# Patient Record
Sex: FEMALE | Race: WHITE | NOT HISPANIC OR LATINO | URBAN - METROPOLITAN AREA
[De-identification: names, ages, dates, MRNs, and addresses within clinical notes are randomized per-mention and may not be internally consistent; named-entity substitution may affect disease eponyms.]

---

## 2021-04-06 DIAGNOSIS — Z23 ENCOUNTER FOR IMMUNIZATION: ICD-10-CM

## 2024-06-19 ENCOUNTER — OFFICE VISIT (OUTPATIENT)
Dept: FAMILY MEDICINE CLINIC | Facility: CLINIC | Age: 49
End: 2024-06-19

## 2024-06-19 VITALS
DIASTOLIC BLOOD PRESSURE: 70 MMHG | HEART RATE: 82 BPM | WEIGHT: 182.6 LBS | SYSTOLIC BLOOD PRESSURE: 110 MMHG | OXYGEN SATURATION: 98 % | HEIGHT: 62 IN | BODY MASS INDEX: 33.6 KG/M2 | TEMPERATURE: 97.7 F

## 2024-06-19 DIAGNOSIS — Z13.220 ENCOUNTER FOR LIPID SCREENING FOR CARDIOVASCULAR DISEASE: ICD-10-CM

## 2024-06-19 DIAGNOSIS — E55.9 VITAMIN D DEFICIENCY: ICD-10-CM

## 2024-06-19 DIAGNOSIS — Z12.11 COLON CANCER SCREENING: ICD-10-CM

## 2024-06-19 DIAGNOSIS — Z13.6 ENCOUNTER FOR LIPID SCREENING FOR CARDIOVASCULAR DISEASE: ICD-10-CM

## 2024-06-19 DIAGNOSIS — Z00.00 ANNUAL PHYSICAL EXAM: Primary | ICD-10-CM

## 2024-06-19 DIAGNOSIS — Z13.1 DIABETES MELLITUS SCREENING: ICD-10-CM

## 2024-06-19 DIAGNOSIS — R06.81 WITNESSED EPISODE OF APNEA: ICD-10-CM

## 2024-06-19 DIAGNOSIS — F33.42 RECURRENT MAJOR DEPRESSIVE DISORDER, IN FULL REMISSION (HCC): ICD-10-CM

## 2024-06-19 PROBLEM — Z15.89 MTHFR GENE MUTATION: Status: ACTIVE | Noted: 2020-07-22

## 2024-06-19 PROBLEM — G47.9 SLEEP DISTURBANCE: Status: ACTIVE | Noted: 2019-04-19

## 2024-06-19 PROBLEM — M19.90 OSTEOARTHRITIS: Status: ACTIVE | Noted: 2024-06-19

## 2024-06-19 PROBLEM — F90.2 ATTENTION DEFICIT HYPERACTIVITY DISORDER (ADHD), COMBINED TYPE: Status: ACTIVE | Noted: 2020-10-02

## 2024-06-19 PROCEDURE — 99386 PREV VISIT NEW AGE 40-64: CPT | Performed by: FAMILY MEDICINE

## 2024-06-19 RX ORDER — DIPHENOXYLATE HYDROCHLORIDE AND ATROPINE SULFATE 2.5; .025 MG/1; MG/1
1 TABLET ORAL DAILY
COMMUNITY

## 2024-06-19 RX ORDER — DULOXETIN HYDROCHLORIDE 60 MG/1
60 CAPSULE, DELAYED RELEASE ORAL DAILY
Qty: 90 CAPSULE | Refills: 1 | Status: SHIPPED | OUTPATIENT
Start: 2024-06-19

## 2024-06-19 RX ORDER — TRANEXAMIC ACID 650 MG/1
TABLET ORAL
COMMUNITY
Start: 2024-06-07

## 2024-06-19 RX ORDER — LORAZEPAM 0.5 MG/1
0.5 TABLET ORAL 3 TIMES DAILY PRN
COMMUNITY
Start: 2024-02-08

## 2024-06-19 RX ORDER — DULOXETIN HYDROCHLORIDE 60 MG/1
CAPSULE, DELAYED RELEASE ORAL
COMMUNITY
Start: 2024-02-08 | End: 2024-06-19 | Stop reason: SDUPTHER

## 2024-06-19 NOTE — PROGRESS NOTES
Adult Annual Physical  Name: Yu Montes      : 1975      MRN: [id number]  Encounter Provider: Mirella Pendleton DO  Encounter Date: 2024   Encounter department: [facility]    Assessment & Plan   1. Annual physical exam  2. Recurrent major depressive disorder, in full remission (HCC)  -     CBC and differential; Future; Expected date: 2024  -     TSH, 3rd generation with Free T4 reflex; Future; Expected date: 2024  -     CBC and differential  -     TSH, 3rd generation with Free T4 reflex  -     DULoxetine (CYMBALTA) 60 mg delayed release capsule; Take 1 capsule (60 mg total) by mouth daily  3. Diabetes mellitus screening  -     Comprehensive metabolic panel; Future; Expected date: 2024  -     Hemoglobin A1C With EAG; Future; Expected date: 2024  -     Comprehensive metabolic panel  -     Hemoglobin A1C With EAG  4. Encounter for lipid screening for cardiovascular disease  -     Lipid Panel with Direct LDL reflex; Future; Expected date: 2024  -     Lipid Panel with Direct LDL reflex  5. Vitamin D deficiency  -     Vitamin D 25 hydroxy; Future; Expected date: 2024  -     Vitamin D 25 hydroxy  6. Colon cancer screening  -     Cologuard  7. Witnessed episode of apnea  -     Ambulatory Referral to Sleep Medicine; Future  8. BMI 33.0-33.9,adult  -     CBC and differential; Future; Expected date: 2024  -     TSH, 3rd generation with Free T4 reflex; Future; Expected date: 2024  -     CBC and differential  -     TSH, 3rd generation with Free T4 reflex  Immunizations and preventive care screenings were discussed with patient today. Appropriate education was printed on patient's after visit summary.    Counseling:  Alcohol/drug use: discussed moderation in alcohol intake, the recommendations for healthy alcohol use, and avoidance of illicit drug use.  Dental Health: discussed importance of regular tooth brushing, flossing, and dental  "visits.  Exercise: the importance of regular exercise/physical activity was discussed. Recommend exercise 3-5 times per week for at least 30 minutes.          History of Present Illness     Adult Annual Physical:  Patient presents for annual physical. Patient is seen to establish care..     Diet and Physical Activity:  - Diet/Nutrition: well balanced diet.  - Exercise: walking, 5-7 times a week on average and 30-60 minutes on average. has a bad knee, lifts weights    Depression Screening:  - PHQ-2 Score: 0    General Health:  - Sleep: sleeps poorly, snores loudly, witnessed apnea, unrefreshing sleep and daytime hypersomnolence.  - Hearing: normal hearing bilateral ears.  - Vision: wears glasses.  - Dental: regular dental visits.    /GYN Health:  - Follows with GYN: yes.   - Last menstrual cycle: 6/9/2024.   - Contraception:. Heavy menses      Review of Systems   Constitutional:  Positive for fatigue.   Psychiatric/Behavioral:  Positive for sleep disturbance.          Objective     /70 (BP Location: Left arm, Patient Position: Sitting, Cuff Size: Large)   Pulse 82   Temp 97.7 °F (36.5 °C) (Temporal)   Ht 5' 2\" (1.575 m)   Wt 82.8 kg (182 lb 9.6 oz)   SpO2 98%   BMI 33.40 kg/m²     Physical Exam  Vitals and nursing note reviewed.   Constitutional:       General: She is not in acute distress.     Appearance: She is well-developed.   HENT:      Head: Normocephalic.      Right Ear: Tympanic membrane normal.      Left Ear: Tympanic membrane normal.      Mouth/Throat:      Pharynx: No posterior oropharyngeal erythema.   Eyes:      General: No scleral icterus.  Neck:      Thyroid: No thyromegaly.      Vascular: No carotid bruit.   Cardiovascular:      Rate and Rhythm: Normal rate and regular rhythm.      Heart sounds: Normal heart sounds. No murmur heard.  Pulmonary:      Effort: Pulmonary effort is normal.      Breath sounds: Normal breath sounds.   Abdominal:      General: Bowel sounds are normal.      " Palpations: Abdomen is soft.   Musculoskeletal:      Right lower leg: No edema.      Left lower leg: No edema.   Lymphadenopathy:      Cervical: No cervical adenopathy.   Skin:     General: Skin is warm and dry.   Neurological:      Mental Status: She is alert and oriented to person, place, and time.   Psychiatric:         Mood and Affect: Mood normal.       Administrative Statements

## 2024-06-22 LAB
25(OH)D3 SERPL-MCNC: 37 NG/ML (ref 30–100)
ALBUMIN SERPL-MCNC: 4.3 G/DL (ref 3.6–5.1)
ALBUMIN/GLOB SERPL: 1.7 (CALC) (ref 1–2.5)
ALP SERPL-CCNC: 62 U/L (ref 31–125)
ALT SERPL-CCNC: 20 U/L (ref 6–29)
AST SERPL-CCNC: 15 U/L (ref 10–35)
BASOPHILS # BLD AUTO: 47 CELLS/UL (ref 0–200)
BASOPHILS NFR BLD AUTO: 0.6 %
BILIRUB SERPL-MCNC: 0.6 MG/DL (ref 0.2–1.2)
BUN SERPL-MCNC: 13 MG/DL (ref 7–25)
BUN/CREAT SERPL: NORMAL (CALC) (ref 6–22)
CALCIUM SERPL-MCNC: 8.9 MG/DL (ref 8.6–10.2)
CHLORIDE SERPL-SCNC: 104 MMOL/L (ref 98–110)
CHOLEST SERPL-MCNC: 185 MG/DL
CHOLEST/HDLC SERPL: 4.2 (CALC)
CO2 SERPL-SCNC: 22 MMOL/L (ref 20–32)
CREAT SERPL-MCNC: 0.75 MG/DL (ref 0.5–0.99)
EOSINOPHIL # BLD AUTO: 203 CELLS/UL (ref 15–500)
EOSINOPHIL NFR BLD AUTO: 2.6 %
ERYTHROCYTE [DISTWIDTH] IN BLOOD BY AUTOMATED COUNT: 12.6 % (ref 11–15)
EST. AVERAGE GLUCOSE BLD GHB EST-MCNC: 111 MG/DL
EST. AVERAGE GLUCOSE BLD GHB EST-SCNC: 6.2 MMOL/L
GFR/BSA.PRED SERPLBLD CYS-BASED-ARV: 98 ML/MIN/1.73M2
GLOBULIN SER CALC-MCNC: 2.6 G/DL (CALC) (ref 1.9–3.7)
GLUCOSE SERPL-MCNC: 88 MG/DL (ref 65–99)
HBA1C MFR BLD: 5.5 % OF TOTAL HGB
HCT VFR BLD AUTO: 41.4 % (ref 35–45)
HDLC SERPL-MCNC: 44 MG/DL
HGB BLD-MCNC: 13.7 G/DL (ref 11.7–15.5)
LDLC SERPL CALC-MCNC: 120 MG/DL (CALC)
LYMPHOCYTES # BLD AUTO: 2122 CELLS/UL (ref 850–3900)
LYMPHOCYTES NFR BLD AUTO: 27.2 %
MCH RBC QN AUTO: 28.9 PG (ref 27–33)
MCHC RBC AUTO-ENTMCNC: 33.1 G/DL (ref 32–36)
MCV RBC AUTO: 87.3 FL (ref 80–100)
MONOCYTES # BLD AUTO: 523 CELLS/UL (ref 200–950)
MONOCYTES NFR BLD AUTO: 6.7 %
NEUTROPHILS # BLD AUTO: 4906 CELLS/UL (ref 1500–7800)
NEUTROPHILS NFR BLD AUTO: 62.9 %
NONHDLC SERPL-MCNC: 141 MG/DL (CALC)
PLATELET # BLD AUTO: 282 THOUSAND/UL (ref 140–400)
PMV BLD REES-ECKER: 11.3 FL (ref 7.5–12.5)
POTASSIUM SERPL-SCNC: 4.7 MMOL/L (ref 3.5–5.3)
PROT SERPL-MCNC: 6.9 G/DL (ref 6.1–8.1)
RBC # BLD AUTO: 4.74 MILLION/UL (ref 3.8–5.1)
SODIUM SERPL-SCNC: 135 MMOL/L (ref 135–146)
TRIGL SERPL-MCNC: 99 MG/DL
TSH SERPL-ACNC: 1.59 MIU/L
WBC # BLD AUTO: 7.8 THOUSAND/UL (ref 3.8–10.8)

## 2024-07-04 LAB — COLOGUARD RESULT REPORTABLE: NEGATIVE

## 2024-08-13 ENCOUNTER — OFFICE VISIT (OUTPATIENT)
Dept: SLEEP CENTER | Facility: CLINIC | Age: 49
End: 2024-08-13
Payer: COMMERCIAL

## 2024-08-13 VITALS
OXYGEN SATURATION: 98 % | HEART RATE: 77 BPM | SYSTOLIC BLOOD PRESSURE: 120 MMHG | WEIGHT: 185.6 LBS | BODY MASS INDEX: 34.16 KG/M2 | DIASTOLIC BLOOD PRESSURE: 70 MMHG | HEIGHT: 62 IN

## 2024-08-13 DIAGNOSIS — G47.39 SLEEP APNEA-LIKE BEHAVIOR: Primary | ICD-10-CM

## 2024-08-13 DIAGNOSIS — G47.00 INSOMNIA, UNSPECIFIED TYPE: ICD-10-CM

## 2024-08-13 DIAGNOSIS — G47.9 SLEEP DISTURBANCE: ICD-10-CM

## 2024-08-13 DIAGNOSIS — R06.81 WITNESSED EPISODE OF APNEA: ICD-10-CM

## 2024-08-13 PROCEDURE — 99245 OFF/OP CONSLTJ NEW/EST HI 55: CPT | Performed by: NURSE PRACTITIONER

## 2024-08-13 NOTE — PROGRESS NOTES
Consultation - [facility]  Sleep Disorders Center  Yu Montes, 1975, MRN: [id number]    8/13/2024        Reason for Consult / Principal Problem:    Evaluation of possible Obstructive Sleep Apnea    CONSULTING PROVIDER:  Mirella Pendleton DO  [address]  [address]  [place] [address]    ASSESSMENT/PLAN:    1. Sleep apnea-like behavior  -     Home Study; Future  2. Witnessed episode of apnea  -     Ambulatory Referral to Sleep Medicine  -     Home Study; Future  3. Sleep disturbance  -     Home Study; Future  4. Insomnia, unspecified type  -     Home Study; Future       Thank you for the opportunity of participating in the evaluation and care of this patient in the Sleep Clinic at [facility].  If there are any questions regarding this evaluation, please feel free to reach out.   ________________________________________________________________________________________________    HPI: Yu Montes is a 49 y.o. female with PMHx of ADHD, depressive disorder, presents for evaluation of possible obstructive sleep apnea.  Her  reports loud snoring and periods of witnessed apnea.  She wakes up frequently throughout the night for unknown reasons.  She does not feel refreshed when waking up and feels tired in the afternoon.  Symptoms have been ongoing for several years.        Prior Sleep Studies:     No prior sleep study      [place] Sleepiness Scale: Total score: 7/24  Greater or equal to 10 is positive for excessive daytime sleepiness    Pertinent Meds: duloxetine 60mg - takes in the evening, lorazepam 0.5mg, takes prn for anxiety, if she takes it at bedtime, she sleeps better      Sleep-Wake Schedule:  She is self-described as having no morning/night preference.  Bedtime: 10:00pm  Wake Time: 5:30am, not feeling refreshed  Difficulty Falling Asleep: No  Avg Number of Awakenings: 3-5 times per night, usually returns to sleep within a few minutes  Cause  of Awakenings: unknown causes  Weekend Sleep Schedule: 11:00pm-6/7:00pm  Naps: in summer, afternoons, one hour    If She does take a nap, naps are refreshing in quality    Avg TST per 24 hours: 7  hours    Sleep-Related Details:  Preferred Sleep Position: side(s)  SDB Symptoms: snoring, witnessed apneas, multiple awakenings, morning headaches, dry mouth/nose, and waking unrefreshed  Bruxism: Yes, has used an OTC oral appliance  Nocturnal GERD: No  Nocturnal Palpitations: No  Nocturnal Anxiety or Rumination: Yes, occasionally uses ativan, which helps  Sleep-Related Hallucinations: No   Sleep Paralysis: No   Cataplexy: No     No symptoms consistent with restless legs syndrome    She denies any parasomnia activity.    Wake-Related Details  Daytime Sleepiness: Yes, she does not feel refreshed when waking up and feels tired in the afternoon    Drowsy Driving: No  Employment:  currently working full time as a teacher  She works [employer] between the hours of 7:30-4:30pm.    CDL license:  No    Caffeine:  16 ounces of coffee in the am    Tobacco:   reports that she has never smoked. She has never been exposed to tobacco smoke. She has never used smokeless tobacco.  E-cig/Vaping:    E-Cigarette/Vaping    E-Cigarette Use Never User       E-Cigarette/Vaping Substances    Nicotine No     THC No     CBD No     Flavoring No     Other No     Unknown No       Alcohol:   reports current alcohol use. Once per week   Drugs:   reports no history of drug use.      Weight Change:   weight has been relatively stable    She does have difficulty with memory or concentration, but has diagnosis of ADHD.      Other Relevant Labs and Studies:  Labs: I have personally reviewed pertinent lab results.  Lab Results   Component Value Date    WBC 7.8 06/21/2024    HGB 13.7 06/21/2024    HCT 41.4 06/21/2024    MCV 87.3 06/21/2024     06/21/2024      Lab Results   Component Value Date    CALCIUM 8.9 06/21/2024    K 4.7 06/21/2024    CO2 22 06/21/2024  "    06/21/2024    BUN 13 06/21/2024    CREATININE 0.75 06/21/2024     No results found for: \"IRON\", \"TIBC\", \"FERRITIN\"  No results found for: \"[id number]\"  No results found for: \"FOLATE\"      No past medical history on file.  Past Surgical History:   Procedure Laterality Date    KNEE SURGERY  1998     Patient Active Problem List   Diagnosis    Allergic rhinitis    Attention deficit hyperactivity disorder (ADHD), combined type    Depressive disorder    MTHFR gene mutation    Osteoarthritis    Sleep disturbance    Vitamin D deficiency    Sleep apnea-like behavior    Witnessed episode of apnea    Insomnia     Allergies as of 08/13/2024    (No Known Allergies)     REVIEW OF SYSTEMS:  Review of Systems  10-point system review completed, all of which are negative except as mentioned above.       CURRENT MEDICATIONS:  Current Outpatient Medications   Medication Instructions    DULoxetine (CYMBALTA) 60 mg, Oral, Daily    LORazepam (ATIVAN) 0.5 mg, Oral, 3 times daily PRN    multivitamin (THERAGRAN) TABS 1 tablet, Oral, Daily    Tranexamic Acid 650 MG TABS Take 2 tabs TID on heaviest days of cycle     SOCIAL HISTORY:  Social History     Tobacco Use    Smoking status: Never     Passive exposure: Never    Smokeless tobacco: Never   Vaping Use    Vaping status: Never Used   Substance Use Topics    Alcohol use: Yes     Comment: social    Drug use: Never     FAMILY HISTORY:  Family History   Problem Relation Age of Onset    Celiac disease Mother     Hypertension Mother     Diabetes type II Brother      Family History of Sleep Disorders: father has symptoms of sleep apnea but has not been tested    Objective:  Vital Signs:  /70 (BP Location: Left arm, Patient Position: Sitting, Cuff Size: Adult)   Pulse 77   Ht 5' 2\" (1.575 m)   Wt 84.2 kg (185 lb 9.6 oz)   LMP 07/30/2024 (Exact Date)   SpO2 98%   BMI 33.95 kg/m²   Body mass index is 33.95 kg/m².  Neck Circumference: Neck Circumference: 15 inches      PHYSICAL " EXAMINATION:    Constitutional: Alert, cooperative, no distress, appears stated age  Skin: Warm, dry, no rashes noted  Eyes: Conjunctiva/corneas clear  ENT: Nasal congestion absent, nares normal, mucosa normal  Posterior Airspace:   Barrios Tongue Position: 3  Retrognathia: absent  Overbite: absent  High Arched Palate: absent  Tongue Scalloping/Ridging: present  Tonsils:  not visualized  Uvula:  barely visualized  Lungs: Clear to auscultation bilaterally, respirations unlabored  Heart: normal rate and regular rhythm, S1/2 normal, no murmur noted, no rub or gallop  Extremities: Normal, no digital clubbing, no pedal edema  Neuro: No focal deficits noted      The review of systems and following portions of the patient's history were reviewed and updated as appropriate: allergies, current medications, past family history, past medical history, past social history, past surgical history and problem list.    ________________________________________________________________________________________________      I have spent a total time of 55 minutes on 08/13/24 in caring for this patient including Risks and benefits of tx options, Instructions for management, Patient and family education, Importance of tx compliance, Risk factor reductions, Impressions, Counseling / Coordination of care, Documenting in the medical record, Reviewing / ordering tests, medicine, procedures  , and Obtaining or reviewing history  .    Today we discussed the anatomy and physiology of the upper airway.  I pointed out how changes in this region can result in both snoring and abnormal breathing events including apneas and hypopneas.  I explained the most common co-morbidities of untreated sleep apnea.  After this we talked about some forms of treatment including application of positive airway pressure, mandibular advancement devices and surgery.  she would consider use of PAP therapy, if ABENA is confirmed.  She would likely do best to start with a  "full face mask.    In order to evaluate the possibility of Obstructive Sleep Apnea as a cause of the patient's symptoms, a home sleep study will be completed to identify the presence or absence of abnormal nocturnal breathing.  If significant abnormal nocturnal breathing is detected, nasal CPAP will be titrated to find the optimum pressure needed to maintain upper airway patency during sleep.  This may be accomplished using APAP or may require an in lab titration study.  Following testing, the patient will return to the Sleep Disorders Center for set up of CPAP equipment with follow up visit to review compliance and effectiveness of treatment 31-91 days after set up.     The following instructions have been given to the patient today:    Patient Instructions    Schedule home sleep study  After testing, the nurse will call with results and recommendations for plan of treatment   Try changing timing of duloxetine to am to see if it is causing insomnia for you.        Nursing Support:  When:  Monday through Friday 7:00am-5:00pm, except holidays  Where:  Direct phone line is [phone], option 3  If you are having a true emergency, please call 911.  In the event that the line is busy or it is after hours, please leave a voice mail message and we will return your call.  Please speak clearly, leaving your full name, birth date, best number to reach you and the reason for your call.  Medication refills:  We will need the name of the medication, the dosage, the ordering provider, whether you get a 30 day or 90 day refill and the pharmacy name and address.  Medications will be ordered by the providers only.  Nurses cannot call in prescriptions.    To reach the Sleep Disorders Center office staff:  Call [phone], option 1, followed by option 3    TAJ Wells  [facility]    Portions of the record may have been created with voice recognition software. Occasional wrong word or \"sound a like\" " substitutions may have occurred due to the inherent limitations of voice recognition software. Please read the chart carefully and recognize, using context, where substitutions have occurred.

## 2024-08-13 NOTE — PATIENT INSTRUCTIONS
Schedule home sleep study  After testing, the nurse will call with results and recommendations for plan of treatment   Try changing timing of duloxetine to am to see if it is causing insomnia for you.        Nursing Support:  When:  Monday through Friday 7:00am-5:00pm, except holidays  Where:  Direct phone line is [phone], option 3  If you are having a true emergency, please call 911.  In the event that the line is busy or it is after hours, please leave a voice mail message and we will return your call.  Please speak clearly, leaving your full name, birth date, best number to reach you and the reason for your call.  Medication refills:  We will need the name of the medication, the dosage, the ordering provider, whether you get a 30 day or 90 day refill and the pharmacy name and address.  Medications will be ordered by the providers only.  Nurses cannot call in prescriptions.    To reach the [facility] Disorders Center office staff:  Call [phone], option 1, followed by option 3

## 2024-10-02 ENCOUNTER — HOSPITAL ENCOUNTER (OUTPATIENT)
Dept: SLEEP CENTER | Facility: CLINIC | Age: 49
Discharge: HOME | End: 2024-10-02
Payer: COMMERCIAL

## 2024-10-02 DIAGNOSIS — G47.39 SLEEP APNEA-LIKE BEHAVIOR: ICD-10-CM

## 2024-10-02 DIAGNOSIS — R06.81 WITNESSED EPISODE OF APNEA: ICD-10-CM

## 2024-10-02 DIAGNOSIS — G47.00 INSOMNIA, UNSPECIFIED TYPE: ICD-10-CM

## 2024-10-02 DIAGNOSIS — G47.9 SLEEP DISTURBANCE: ICD-10-CM

## 2024-10-02 PROCEDURE — G0399 HOME SLEEP TEST/TYPE 3 PORTA: HCPCS

## 2024-10-03 NOTE — PROGRESS NOTES
Home Sleep Study Documentation    HOME STUDY DEVICE: Noxturnal yes                                           Mali G3 no      Pre-Sleep Home Study:    Set-up and instructions performed by:  BECK Mosley    Technician performed demonstration for Patient: yes    Return demonstration performed by Patient: yes    Written instructions provided to Patient: yes    Patient signed consent form: yes        Post-Sleep Home Study:    Additional comments by Patient: None    Home Sleep Study Failed:no:    Failure reason: N/A    Reported or Detected: N/A    Scored by: BECK Lima

## 2024-10-04 PROBLEM — G47.33 OSA (OBSTRUCTIVE SLEEP APNEA): Status: ACTIVE | Noted: 2024-10-04

## 2024-10-06 PROCEDURE — G0399 HOME SLEEP TEST/TYPE 3 PORTA: HCPCS | Performed by: PSYCHIATRY & NEUROLOGY

## 2024-10-07 DIAGNOSIS — G47.01 INSOMNIA DUE TO MEDICAL CONDITION: ICD-10-CM

## 2024-10-07 DIAGNOSIS — F32.A DEPRESSIVE DISORDER: ICD-10-CM

## 2024-10-07 DIAGNOSIS — G47.9 SLEEP DISTURBANCE: ICD-10-CM

## 2024-10-07 DIAGNOSIS — G47.33 OSA (OBSTRUCTIVE SLEEP APNEA): Primary | ICD-10-CM

## 2024-10-14 ENCOUNTER — PATIENT MESSAGE (OUTPATIENT)
Dept: SLEEP CENTER | Facility: CLINIC | Age: 49
End: 2024-10-14

## 2024-10-17 ENCOUNTER — TELEPHONE (OUTPATIENT)
Dept: SLEEP CENTER | Facility: CLINIC | Age: 49
End: 2024-10-17

## 2024-10-22 LAB

## 2024-10-28 LAB
DME PARACHUTE DELIVERY DATE ACTUAL: NORMAL
DME PARACHUTE DELIVERY DATE EXPECTED: NORMAL
DME PARACHUTE DELIVERY DATE REQUESTED: NORMAL
DME PARACHUTE ITEM DESCRIPTION: NORMAL
DME PARACHUTE ORDER STATUS: NORMAL
DME PARACHUTE SUPPLIER NAME: NORMAL
DME PARACHUTE SUPPLIER PHONE: NORMAL

## 2024-11-03 ENCOUNTER — PATIENT MESSAGE (OUTPATIENT)
Dept: SLEEP CENTER | Facility: CLINIC | Age: 49
End: 2024-11-03

## 2024-11-03 DIAGNOSIS — G47.33 OSA (OBSTRUCTIVE SLEEP APNEA): Primary | ICD-10-CM

## 2024-11-04 ENCOUNTER — TELEPHONE (OUTPATIENT)
Dept: SLEEP CENTER | Facility: CLINIC | Age: 49
End: 2024-11-04

## 2024-11-04 DIAGNOSIS — F33.42 RECURRENT MAJOR DEPRESSIVE DISORDER, IN FULL REMISSION (HCC): ICD-10-CM

## 2024-11-04 RX ORDER — DULOXETIN HYDROCHLORIDE 30 MG/1
30 CAPSULE, DELAYED RELEASE ORAL DAILY
Qty: 90 CAPSULE | Refills: 0 | Status: SHIPPED | OUTPATIENT
Start: 2024-11-04

## 2024-11-04 NOTE — TELEPHONE ENCOUNTER
Per [employer] message:  Hello, I have been trying to get used to the cpap machine. When I first put the mask on for about 5-10 minutes I feel extremely claustrophobic and sometimes have to take it off. I’m wondering if there is not enough air when it starts and that’s why I feel like I can’t get air. Also I am interested in trying a pillow nose mask to see if that works better. Who do I contact for thes?? Thanks for your help   -------------------------------------------------    30 day and 7 day detailed compliance reports uploaded to media.    Patient notified via [employer] regarding recommendation to schedule mask fitting appointment.    TAJ Wells notified.

## 2024-11-07 LAB
DME PARACHUTE DELIVERY DATE REQUESTED: NORMAL
DME PARACHUTE ITEM DESCRIPTION: NORMAL
DME PARACHUTE ORDER STATUS: NORMAL
DME PARACHUTE SUPPLIER NAME: NORMAL
DME PARACHUTE SUPPLIER PHONE: NORMAL

## 2024-11-11 ENCOUNTER — TELEPHONE (OUTPATIENT)
Dept: SLEEP CENTER | Facility: CLINIC | Age: 49
End: 2024-11-11

## 2024-11-11 NOTE — TELEPHONE ENCOUNTER
nevaeh Montes   to [facility] (supporting TAJ Wells)   FERCHO      11/8/24  9:15 AM  Hello,      I recently started using a nasal pillow and that seems better. I do not think my pressure ever changed though. It is still at 4 when I start and during the night I have been waking up around 1 or 2 and the pressure is only at 6. It is hard to go back to sleep at that point. Are you able to help with this?  Thank you very much.  _____________________________  Call to patient voicemail message and number left for her to return a call to [phone].  [employer] message sent as well.

## 2024-11-12 LAB
DME PARACHUTE DELIVERY DATE ACTUAL: NORMAL
DME PARACHUTE DELIVERY DATE REQUESTED: NORMAL
DME PARACHUTE ITEM DESCRIPTION: NORMAL
DME PARACHUTE ORDER STATUS: NORMAL
DME PARACHUTE SUPPLIER NAME: NORMAL
DME PARACHUTE SUPPLIER PHONE: NORMAL

## 2024-12-02 ENCOUNTER — PATIENT MESSAGE (OUTPATIENT)
Dept: SLEEP CENTER | Facility: CLINIC | Age: 49
End: 2024-12-02

## 2024-12-02 DIAGNOSIS — G47.01 INSOMNIA DUE TO MEDICAL CONDITION: Primary | ICD-10-CM

## 2024-12-02 RX ORDER — TRAZODONE HYDROCHLORIDE 50 MG/1
50 TABLET, FILM COATED ORAL
Qty: 30 TABLET | Refills: 2 | Status: SHIPPED | OUTPATIENT
Start: 2024-12-02

## 2024-12-04 NOTE — PROGRESS NOTES
Assessment & Plan  Obesity, Class I, BMI 30-34.9  Starting Zepbound 2.5 mg, increase to 5 mg after 4 weeks if tolerating and approved     Current weight: 190  Goal weight: 160    Common Side effects: nausea, vomiting, constipation, alopecia, fatigue and reflux  Precautions: No history of recurrent pancreatitis, no suicidal behavior and ideation, diabetic retinopathy.      Contraindications: No history of Men 2 syndrome and family/personal history of medullary thyroid cancer.      Patient understands the risk and benefits of using this medication as well as the side effects.      Patient has been overweight for many years.  They have tried many different ways to lose weight including various diets and exercise regimens which have been unsuccessful in the recent years. As per FDA guidelines, patient has a BMI greater than 30 associated with comorbidities which include vitamin D deficiency, major depressive disorder, hyperlipidemia, Jony on CPAP    Initial weight loss goal of 5-10% weight loss for improved health as studies have shown this has proven to decrease risk of obesity related conditions and co-morbid conditions and/or prevent weight-related complications. Explained the importance of making lifestyle modifications changes in conjunction with weight loss medications.    Discussed options of HealthyCORE-Intensive Lifestyle Intervention Program, Very Low Calorie Diet-VLCD, and Conservative Program and the role of weight loss medications.Patient is interested in pursuing Conservative Program.     Recommended to not skip meals.  Spoke about practicing mindful eating with frequent meals and portion control.  Recommend to avoid snacking at bedtime especially with a high carbohydrate load and to be consistent with protein intake. Recommend adequate hydration which is at least half your body weight in ounces unless medically contraindicated (ie. Systolic heart failure) with also considering GI losses and medication  induced fluid loss if applicable. Recommend incorporating resistance training and/or strength training to help improve metabolic rate if able to tolerate.       Hyperlipidemia  Diet controlled   Insomnia  Continue with Trazodone   ABENA (obstructive sleep apnea)  Continue with CPAP  Depressive disorder  Continue Cymbalta 30       Return in about 5 weeks (around 2025) for followup .      Total time spent reviewing chart, reviewing labs,interviewing patient, examining patient, discussing plan, answering all questions, and documentin min. Most recent notes and records were reviewed.      ______________________________________________________________________        Subjective:     Chief Complaint   Patient presents with    Consult     MWM-Consult; Gw-160lb ; Waist-40in ;Pt has sleep apnea       HPI: Yu Montes  is a 49 y.o. female with past medical history of vitamin D deficiency, class I obesity, major depressive disorder, hyperlipidemia, ABENA on CPAP presents to the clinic to pursue weight loss management.     Prior anti-obesity medication: none   Prior weight loss attempts and onset of weight gain: Tired for 15 years, self created diets, exercise.     Current weight: 190  Goal weight: 160    Daily Diet: Yougurt, granola, prestzels and sandwhich ham cheese, Dinner- hamburgers, spagetti, tacos. Snacks: chocolates (sweets)   Skip Meals: do not   Dining out/takeout: twice a month  Hydration: 1 cup coffee, 4-5 bottles.     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 Denies any hx of glaucoma, seizures, kidney stones, gallstones, CAD, PAD, palpitations, arrhythmia. Denies uncontrolled anxiety or depression, suicidal ideation or behavior, insomnia or sleep disturbance.     Review Of Systems:  Constitutional:  Negative for diaphoresis.  "  Gastrointestinal:  Negative for abdominal pain, constipation, nausea and vomiting.   Skin:  Negative for pallor.   Psychiatric/Behavioral:  Negative for agitation, behavioral problems, confusion, dysphoric mood and hallucinations.    All other systems reviewed and are negative.     Objective:  /80   Pulse 84   Temp 98.5 °F (36.9 °C)   Resp 16   Ht 5' 2\" (1.575 m)   Wt 86.2 kg (190 lb)   BMI 34.75 kg/m²     Wt Readings from Last 10 Encounters:   12/05/24 86.2 kg (190 lb)   08/13/24 84.2 kg (185 lb 9.6 oz)   06/19/24 82.8 kg (182 lb 9.6 oz)       Physical Exam  Constitutional:       General: No acute distress.   HENT:      Head: Normocephalic and atraumatic.   Eyes:      Extraocular Movements: Extraocular movements intact.      Conjunctiva/sclera: Conjunctivae normal.      Pupils: Pupils are equal, round, and reactive to light.   Cardiovascular:      Rate and Rhythm: Normal rate.   Pulmonary:      Effort: Pulmonary effort is normal.   Neurological:      General: No focal deficit present.      Mental Status: Alert and oriented to person, place, and time. Mental status is at baseline.   Psychiatric:         Mood and Affect: Mood normal.         Behavior: Behavior normal.     Labs and Imaging  Recent labs and imaging have been personally reviewed.    Lab Results   Component Value Date    WBC 7.8 06/21/2024    HGB 13.7 06/21/2024    HCT 41.4 06/21/2024    MCV 87.3 06/21/2024     06/21/2024       Lab Results   Component Value Date    SODIUM 135 06/21/2024    K 4.7 06/21/2024     06/21/2024    CO2 22 06/21/2024    AGAP 7 07/28/2022    BUN 13 06/21/2024    CREATININE 0.75 06/21/2024    GLUC 88 06/21/2024    CALCIUM 8.9 06/21/2024    AST 15 06/21/2024    ALT 20 06/21/2024    ALKPHOS 62 06/21/2024    TP 6.9 06/21/2024    TBILI 0.6 06/21/2024    EGFR 98 06/21/2024       Lab Results   Component Value Date    HGBA1C 5.5 06/21/2024       Lab Results   Component Value Date    TSH 1.33 04/25/2019       Lab " Results   Component Value Date    CHOLESTEROL 185 06/21/2024       Lab Results   Component Value Date    HDL 44 (L) 06/21/2024       Lab Results   Component Value Date    TRIG 99 06/21/2024       Lab Results   Component Value Date    LDLCALC 120 (H) 06/21/2024

## 2024-12-05 ENCOUNTER — OFFICE VISIT (OUTPATIENT)
Dept: BARIATRICS | Facility: CLINIC | Age: 49
End: 2024-12-05
Payer: COMMERCIAL

## 2024-12-05 VITALS
WEIGHT: 190 LBS | BODY MASS INDEX: 34.96 KG/M2 | HEIGHT: 62 IN | HEART RATE: 84 BPM | DIASTOLIC BLOOD PRESSURE: 80 MMHG | TEMPERATURE: 98.5 F | RESPIRATION RATE: 16 BRPM | SYSTOLIC BLOOD PRESSURE: 128 MMHG

## 2024-12-05 DIAGNOSIS — E78.5 HYPERLIPIDEMIA: ICD-10-CM

## 2024-12-05 DIAGNOSIS — E66.811 OBESITY, CLASS I, BMI 30-34.9: ICD-10-CM

## 2024-12-05 DIAGNOSIS — G47.33 OSA (OBSTRUCTIVE SLEEP APNEA): ICD-10-CM

## 2024-12-05 DIAGNOSIS — E66.811 OBESITY, CLASS I, BMI 30-34.9: Primary | ICD-10-CM

## 2024-12-05 DIAGNOSIS — G47.00 INSOMNIA: ICD-10-CM

## 2024-12-05 DIAGNOSIS — F32.A DEPRESSIVE DISORDER: ICD-10-CM

## 2024-12-05 PROCEDURE — 99204 OFFICE O/P NEW MOD 45 MIN: CPT | Performed by: STUDENT IN AN ORGANIZED HEALTH CARE EDUCATION/TRAINING PROGRAM

## 2024-12-05 RX ORDER — TIRZEPATIDE 2.5 MG/.5ML
2.5 INJECTION, SOLUTION SUBCUTANEOUS WEEKLY
Qty: 2 ML | Refills: 0 | Status: SHIPPED | OUTPATIENT
Start: 2024-12-05 | End: 2024-12-05 | Stop reason: SDUPTHER

## 2024-12-05 RX ORDER — TIRZEPATIDE 2.5 MG/.5ML
2.5 INJECTION, SOLUTION SUBCUTANEOUS WEEKLY
Qty: 2 ML | Refills: 0 | Status: SHIPPED | OUTPATIENT
Start: 2024-12-05 | End: 2025-01-02

## 2024-12-05 NOTE — TELEPHONE ENCOUNTER
*PHARMACY CHANGE*    Reason for call:   [x] Refill   [] Prior Auth  [] Other:     Office:   [] PCP/Provider -   [x] Specialty/Provider - [facility] / Harvey Gan MD    Medication:   ~ tirzepatide (Zepbound) 2.5 mg/0.5 mL auto-injector - Inject 0.5 mL (2.5 mg total) under the skin once a week for 28 days     Pharmacy:   [id number] - [place] [address]     Does the patient have enough for 3 days?   [] Yes   [x] No - Send as HP to POD

## 2024-12-10 ENCOUNTER — TELEPHONE (OUTPATIENT)
Dept: BARIATRICS | Facility: CLINIC | Age: 49
End: 2024-12-10

## 2024-12-10 NOTE — TELEPHONE ENCOUNTER
PA for Zepbound 2.5mg SUBMITTED to [employer]     via    [x]CMM-KEY: [id number]  []Surescripts-Case ID #   []Availity-Auth ID # NDC #   []Faxed to plan   []Other website   []Phone call Case ID #     []PA sent as URGENT    All office notes, labs and other pertaining documents and studies sent. Clinical questions answered. Awaiting determination from insurance company.     Turnaround time for your insurance to make a decision on your Prior Authorization can take 7-21 business days.

## 2024-12-26 DIAGNOSIS — E78.5 HYPERLIPIDEMIA: ICD-10-CM

## 2024-12-26 DIAGNOSIS — E66.811 OBESITY, CLASS I, BMI 30-34.9: ICD-10-CM

## 2024-12-26 RX ORDER — TIRZEPATIDE 2.5 MG/.5ML
2.5 INJECTION, SOLUTION SUBCUTANEOUS WEEKLY
Qty: 2 ML | Refills: 0 | Status: SHIPPED | OUTPATIENT
Start: 2024-12-26 | End: 2025-01-23

## 2024-12-31 DIAGNOSIS — E78.5 HYPERLIPIDEMIA, UNSPECIFIED HYPERLIPIDEMIA TYPE: ICD-10-CM

## 2024-12-31 DIAGNOSIS — G47.00 INSOMNIA, UNSPECIFIED TYPE: ICD-10-CM

## 2024-12-31 DIAGNOSIS — E66.811 OBESITY, CLASS I, BMI 30-34.9: Primary | ICD-10-CM

## 2024-12-31 DIAGNOSIS — G47.33 OSA (OBSTRUCTIVE SLEEP APNEA): ICD-10-CM

## 2024-12-31 DIAGNOSIS — F32.A DEPRESSIVE DISORDER: ICD-10-CM

## 2024-12-31 RX ORDER — TIRZEPATIDE 5 MG/.5ML
5 INJECTION, SOLUTION SUBCUTANEOUS WEEKLY
Qty: 2 ML | Refills: 3 | Status: SHIPPED | OUTPATIENT
Start: 2024-12-31 | End: 2024-12-31

## 2024-12-31 RX ORDER — TIRZEPATIDE 5 MG/.5ML
5 INJECTION, SOLUTION SUBCUTANEOUS WEEKLY
Qty: 2 ML | Refills: 3 | Status: SHIPPED | OUTPATIENT
Start: 2024-12-31

## 2025-01-06 ENCOUNTER — TELEPHONE (OUTPATIENT)
Age: 50
End: 2025-01-06

## 2025-01-06 NOTE — TELEPHONE ENCOUNTER
Patient called need to reschedule appointment. Warm transferred to [facility] for further assistance.

## 2025-01-08 ENCOUNTER — OFFICE VISIT (OUTPATIENT)
Dept: SLEEP CENTER | Facility: CLINIC | Age: 50
End: 2025-01-08
Payer: COMMERCIAL

## 2025-01-08 VITALS
BODY MASS INDEX: 32.76 KG/M2 | SYSTOLIC BLOOD PRESSURE: 122 MMHG | DIASTOLIC BLOOD PRESSURE: 82 MMHG | WEIGHT: 178 LBS | HEIGHT: 62 IN

## 2025-01-08 DIAGNOSIS — F51.04 PSYCHOPHYSIOLOGICAL INSOMNIA: ICD-10-CM

## 2025-01-08 DIAGNOSIS — G47.9 SLEEP DISTURBANCE: ICD-10-CM

## 2025-01-08 DIAGNOSIS — E66.09 CLASS 1 OBESITY DUE TO EXCESS CALORIES WITHOUT SERIOUS COMORBIDITY WITH BODY MASS INDEX (BMI) OF 32.0 TO 32.9 IN ADULT: ICD-10-CM

## 2025-01-08 DIAGNOSIS — E66.811 CLASS 1 OBESITY DUE TO EXCESS CALORIES WITHOUT SERIOUS COMORBIDITY WITH BODY MASS INDEX (BMI) OF 32.0 TO 32.9 IN ADULT: ICD-10-CM

## 2025-01-08 DIAGNOSIS — G47.33 OBSTRUCTIVE SLEEP APNEA TREATED WITH CONTINUOUS POSITIVE AIRWAY PRESSURE (CPAP): Primary | ICD-10-CM

## 2025-01-08 PROBLEM — G47.39 SLEEP APNEA-LIKE BEHAVIOR: Status: RESOLVED | Noted: 2024-08-13 | Resolved: 2025-01-08

## 2025-01-08 PROCEDURE — 99214 OFFICE O/P EST MOD 30 MIN: CPT | Performed by: NURSE PRACTITIONER

## 2025-01-08 RX ORDER — TRAZODONE HYDROCHLORIDE 50 MG/1
75 TABLET, FILM COATED ORAL
Qty: 135 TABLET | Refills: 2 | Status: SHIPPED | OUTPATIENT
Start: 2025-01-08

## 2025-01-08 NOTE — ASSESSMENT & PLAN NOTE
She noted increase in sleep initiation and sleep maintenance insomnia when she began use of CPAP equipment.  She also reported that she was waking up earlier than she would like, sometimes as early as 3:00-4:00am.  We discussed pushing bedtime later, which may allow her to sleep later.    She began use of trazodone at bedtime.  She noticed improvement in sleep initiation, but still wakes up a few times per night.  She reports that she is able to return to sleep more easily with use of trazodone.    She denies any side effects with use of trazodone.  She will increase the dose to 75mg at bedtime.  She was advised to call with an update if not improving.    We discussed changing to doxepin, if symptoms not improving with trazodone.  We also discussed changing cymbalta to am dosing instead of at bedtime.    Orders:    traZODone (DESYREL) 50 mg tablet; Take 1.5 tablets (75 mg total) by mouth daily at bedtime

## 2025-01-08 NOTE — PATIENT INSTRUCTIONS
Patient Instructions:    1.  Continue use of CPAP equipment nightly  2.  Continue to clean your equipment, as discussed  3.  Contact the [facility] with any questions or concerns prior to your next visit, as needed  4.  Schedule visit for follow-up in 6 months  5.  Continue using Zepbound for weight loss through weight management  6.  Continue using the [employer] jad and call if events are consistently higher than 5  7.  Continue trazodone and increase to one and a half tablets at bedtime  8.  Change dosing time of cymbalta to the am        Thank you for trusting me with your care!    I know we often  cover a lot of information at the visit, so if you have follow-up questions, are unclear about the plan, or feel there were important items that we did not discuss or you did not receive clarity on, please don't hesitate to reach out to me.     irisnote messages are preferred for routine matters.  Please make sure to call for urgent matters as there can be a delay in responding to questions over irisnote.    IMPORTANT- Prior to a sleep study (at home or in the sleep lab), I strongly recommend contacting your medical insurance first to understand your benefits (including deductible if applicable), coverage for this test, and out of pocket costs.  Even if the test is approved by medical insurance, the cost to you is determined by your medical benefits.   If you have concerns about your out of pocket costs for sleep testing, please contact me/the office before you complete the test and we can discuss if there are alternate options.     I recommend following this advice in general before any lab test, imaging test, doctor visit, surgery, or ordering CPAP supplies as it is best to understand your coverage to avoid unexpected bills after the fact.       Nursing Support:  When: Monday through Friday 7:30A-4:30PM except holidays  Where: Our direct line is [phone]  *3  *1.      If you are having a true emergency  please call 911.  In the event that the line is busy or it is after hours please leave a voice message and we will return your call.  Please speak clearly, leaving your full name, birth date, best number to reach you and the reason for your call.   Medication refills: We will need the name of the medication, the dosage, the ordering provider, whether you get a 30 or 90 day refill, and the pharmacy name and address.  Medications will be ordered by the provider only.  Nurses cannot call in prescriptions.  Please allow 7 days for medication refills.  Physician requested updates: If your provider requested that you call with an update after starting medication, please be ready to provide us the medication and dosage, what time you take your medication, the time you attempt to fall asleep, time you fall asleep, when you wake up, and what time you get out of bed.  Sleep Study Results: We will contact you with sleep study results and/or next steps after the physician has reviewed your testing.

## 2025-01-08 NOTE — ASSESSMENT & PLAN NOTE
She began use of CPAP equipment.  She notes improved snoring, better sleep with fewer awakenings, feeling less sleepy in the daytime, fewer headaches, improved mood and improved health with use of CPAP.    She states that she was having bilateral carpal tunnel symptoms and was planning to have surgery.  Since using CPAP equipment, she has not had any numbness and tingling in her arm/wrist/hands as she had previously.  She tried several different masks.  She has some irritation along the nasal cradle.  A mask fitting was arranged today.  She feels the starting pressure is comfortable, but notices it feels too strong at times and leaks around the mouth area.  A pressure change was ordered today.      Current PAP Compliance Data:  Average usage:  She has been able to use the equipment 100% of all days recorded.  Average usage was 4 or more hours 100% of all days recorded.  Average hours used:  6 hours and 39 minutes  Average time in an air leak:  minimal   Average pressure:  12 cm of water pressure  Residual AHI:  0.4/hour, including 0.3 OA, 0.0 CA, 0.1 hypopneas    The patient feels they benefit from the use of PAP equipment and would like to continue PAP therapy.    Response to treatment has been very good.    The patient is at goal for hours of PAP use and at goal for effectiveness of treatment.    A prescription for supplies has been provided to last for the next year.  The patient was advised to continue to clean the equipment appropriately, as discussed and to change supplies regularly.    She will continue using this equipment at the settings noted above for the next 6 months.  At that timeshe will then return for a routine follow-up evaluation. I have asked the patient to contact the [facility] if she encounters any difficulties prior to that time.  Orders:    Resmed DME Pressure Change    PAP DME Resupply/Reorder

## 2025-01-08 NOTE — PROGRESS NOTES
Name: Yu Montes      : 1975      MRN: [id number]  Encounter Provider: TAJ Wells  Encounter Date: 2025   Encounter department[facility]  :  Assessment & Plan  Obstructive sleep apnea treated with continuous positive airway pressure (CPAP)  She began use of CPAP equipment.  She notes improved snoring, better sleep with fewer awakenings, feeling less sleepy in the daytime, fewer headaches, improved mood and improved health with use of CPAP.    She states that she was having bilateral carpal tunnel symptoms and was planning to have surgery.  Since using CPAP equipment, she has not had any numbness and tingling in her arm/wrist/hands as she had previously.  She tried several different masks.  She has some irritation along the nasal cradle.  A mask fitting was arranged today.  She feels the starting pressure is comfortable, but notices it feels too strong at times and leaks around the mouth area.  A pressure change was ordered today.      Current PAP Compliance Data:  Average usage:  She has been able to use the equipment 100% of all days recorded.  Average usage was 4 or more hours 100% of all days recorded.  Average hours used:  6 hours and 39 minutes  Average time in an air leak:  minimal   Average pressure:  12 cm of water pressure  Residual AHI:  0.4/hour, including 0.3 OA, 0.0 CA, 0.1 hypopneas    The patient feels they benefit from the use of PAP equipment and would like to continue PAP therapy.    Response to treatment has been very good.    The patient is at goal for hours of PAP use and at goal for effectiveness of treatment.    A prescription for supplies has been provided to last for the next year.  The patient was advised to continue to clean the equipment appropriately, as discussed and to change supplies regularly.    She will continue using this equipment at the settings noted above for the next 6 months.  At that timeshe will then return for a routine  follow-up evaluation. I have asked the patient to contact the [facility] if she encounters any difficulties prior to that time.  Orders:    Resmed DME Pressure Change    PAP DME Resupply/Reorder    Psychophysiological insomnia  She noted increase in sleep initiation and sleep maintenance insomnia when she began use of CPAP equipment.  She also reported that she was waking up earlier than she would like, sometimes as early as 3:00-4:00am.  We discussed pushing bedtime later, which may allow her to sleep later.    She began use of trazodone at bedtime.  She noticed improvement in sleep initiation, but still wakes up a few times per night.  She reports that she is able to return to sleep more easily with use of trazodone.    She denies any side effects with use of trazodone.  She will increase the dose to 75mg at bedtime.  She was advised to call with an update if not improving.    We discussed changing to doxepin, if symptoms not improving with trazodone.  We also discussed changing cymbalta to am dosing instead of at bedtime.    Orders:    traZODone (DESYREL) 50 mg tablet; Take 1.5 tablets (75 mg total) by mouth daily at bedtime    Sleep disturbance  She noted increase in sleep initiation and sleep maintenance insomnia when she began use of CPAP equipment.  She also reported that she was waking up earlier than she would like, sometimes as early as 3:00-4:00am.  We discussed pushing bedtime later, which may allow her to sleep later.    She began use of trazodone at bedtime.  She noticed improvement in sleep initiation, but still wakes up a few times per night.  She reports that she is able to return to sleep more easily with use of trazodone.    She denies any side effects with use of trazodone.  She will increase the dose to 75mg at bedtime.  She was advised to call with an update if not improving.    We discussed changing to doxepin, if symptoms not improving with trazodone.  We also discussed changing  cymbalta to am dosing instead of at bedtime.       Class 1 obesity due to excess calories without serious comorbidity with body mass index (BMI) of 32.0 to 32.9 in adult  Obesity - We reviewed the association of obesity on obstructive sleep apnea and sleep disordered breathing. Encouraged patient to follow healthy diet, regular exercise regimen, and pursue weight loss to manage symptoms.  We reviewed the new FDA indication for use of Zepbound in patients with comorbid obesity and ABENA.  She plans to continue to work with weight management and continue use of Zepbound.             History of Present Illness   Yu Montes is a 49 y.o. female with PMHx of ADHD, depressive disorder, presents for follow up of mild obstructive sleep apnea.  Her  reported loud snoring and periods of witnessed apnea.  She wakes up frequently throughout the night for unknown reasons.  Sleep was non-refreshing with daytime sleepiness.  Symptoms were ongoing for several years.   She completed a home sleep study in October 2024.  During the study, she had a total of 41 respiratory events made up of 13 obstructive apneas, 0 central apneas,  0 mixed apneas and 28 hypopneas resulting in a respiratory event index (MYLES) of 5.2 .  The lowest SPO2 recorded is 86.0%.  Due to symptoms, the mild ABENA was treated with AutoCPAP.        Patient accompanied at this vist by : unaccompanied    Sitting and reading: (Patient-Rptd) (P) Slight chance of dozing  Watching TV: (Patient-Rptd) (P) Slight chance of dozing  Sitting, inactive in a public place (e.g. a theatre or a meeting): (Patient-Rptd) (P) Would never doze  As a passenger in a car for an hour without a break: (Patient-Rptd) (P) Would never doze  Lying down to rest in the afternoon when circumstances permit: (Patient-Rptd) (P) Moderate chance of dozing  Sitting and talking to someone: (Patient-Rptd) (P) Would never doze  Sitting quietly after a lunch without alcohol: (Patient-Rptd) (P) Would never  "doze  In a car, while stopped for a few minutes in traffic: (Patient-Rptd) (P) Would never doze  Total score: (Patient-Rptd) (P) 4     Review of Systems   Psychiatric/Behavioral:  Positive for sleep disturbance.        Current Outpatient Medications on File Prior to Visit   Medication Sig Dispense Refill    DULoxetine (CYMBALTA) 30 mg delayed release capsule Take 1 capsule (30 mg total) by mouth daily 90 capsule 0    LORazepam (ATIVAN) 0.5 mg tablet Take 0.5 mg by mouth Three times daily as needed      multivitamin (THERAGRAN) TABS Take 1 tablet by mouth daily      tirzepatide (Zepbound) 5 mg/0.5 mL auto-injector Inject 0.5 mL (5 mg total) under the skin once a week 2 mL 3    Tranexamic Acid 650 MG TABS Take 2 tabs TID on heaviest days of cycle      [DISCONTINUED] traZODone (DESYREL) 50 mg tablet Take 1 tablet (50 mg total) by mouth daily at bedtime 30 tablet 2     No current facility-administered medications on file prior to visit.      Objective   /82   Ht 5' 2\" (1.575 m)   Wt 80.7 kg (178 lb)   BMI 32.56 kg/m²        Physical Exam    Constitutional: Alert, cooperative, no distress, appears stated age, obese  Skin: Warm, dry, no rashes noted  Lungs: Clear to auscultation bilaterally, respirations unlabored  Heart: normal rate and regular rhythm, S1/2 normal, no murmur noted, no rub or gallop  Extremities: Normal, no digital clubbing, no pedal edema  Neuro: No focal deficits noted    Data  Lab Results   Component Value Date    HGB 13.7 06/21/2024    HCT 41.4 06/21/2024    MCV 87.3 06/21/2024      Lab Results   Component Value Date    CALCIUM 8.9 06/21/2024    K 4.7 06/21/2024    CO2 22 06/21/2024     06/21/2024    BUN 13 06/21/2024    CREATININE 0.75 06/21/2024     No results found for: \"IRON\", \"TIBC\", \"FERRITIN\"  Lab Results   Component Value Date    AST 15 06/21/2024    ALT 20 06/21/2024     Patient Instructions:    1.  Continue use of CPAP equipment nightly  2.  Continue to clean your equipment, " as discussed  3.  Contact the [facility] with any questions or concerns prior to your next visit, as needed  4.  Schedule visit for follow-up in 6 months  5.  Continue using Zepbound for weight loss through weight management  6.  Continue using the [employer] jad and call if events are consistently higher than 5  7.  Continue trazodone and increase to one and a half tablets at bedtime  8.  Change dosing time of cymbalta to the am        Thank you for trusting me with your care!    I know we often  cover a lot of information at the visit, so if you have follow-up questions, are unclear about the plan, or feel there were important items that we did not discuss or you did not receive clarity on, please don't hesitate to reach out to me.     Accion messages are preferred for routine matters.  Please make sure to call for urgent matters as there can be a delay in responding to questions over Accion.    IMPORTANT- Prior to a sleep study (at home or in the sleep lab), I strongly recommend contacting your medical insurance first to understand your benefits (including deductible if applicable), coverage for this test, and out of pocket costs.  Even if the test is approved by medical insurance, the cost to you is determined by your medical benefits.   If you have concerns about your out of pocket costs for sleep testing, please contact me/the office before you complete the test and we can discuss if there are alternate options.     I recommend following this advice in general before any lab test, imaging test, doctor visit, surgery, or ordering CPAP supplies as it is best to understand your coverage to avoid unexpected bills after the fact.       Nursing Support:  When: Monday through Friday 7:30A-4:30PM except holidays  Where: Our direct line is [phone]  *3  *1.      If you are having a true emergency please call 911.  In the event that the line is busy or it is after hours please leave a voice message and we  will return your call.  Please speak clearly, leaving your full name, birth date, best number to reach you and the reason for your call.   Medication refills: We will need the name of the medication, the dosage, the ordering provider, whether you get a 30 or 90 day refill, and the pharmacy name and address.  Medications will be ordered by the provider only.  Nurses cannot call in prescriptions.  Please allow 7 days for medication refills.  Physician requested updates: If your provider requested that you call with an update after starting medication, please be ready to provide us the medication and dosage, what time you take your medication, the time you attempt to fall asleep, time you fall asleep, when you wake up, and what time you get out of bed.  Sleep Study Results: We will contact you with sleep study results and/or next steps after the physician has reviewed your testing.      Administrative Statements   I have spent a total time of 30 minutes in caring for this patient on the day of the visit/encounter including Risks and benefits of tx options, Instructions for management, Patient and family education, Importance of tx compliance, Risk factor reductions, Impressions, Counseling / Coordination of care, Documenting in the medical record, and Reviewing / ordering tests, medicine, procedures  .

## 2025-01-09 ENCOUNTER — TELEPHONE (OUTPATIENT)
Dept: SLEEP CENTER | Facility: CLINIC | Age: 50
End: 2025-01-09

## 2025-01-09 NOTE — ASSESSMENT & PLAN NOTE
She noted increase in sleep initiation and sleep maintenance insomnia when she began use of CPAP equipment.  She also reported that she was waking up earlier than she would like, sometimes as early as 3:00-4:00am.  We discussed pushing bedtime later, which may allow her to sleep later.    She began use of trazodone at bedtime.  She noticed improvement in sleep initiation, but still wakes up a few times per night.  She reports that she is able to return to sleep more easily with use of trazodone.    She denies any side effects with use of trazodone.  She will increase the dose to 75mg at bedtime.  She was advised to call with an update if not improving.    We discussed changing to doxepin, if symptoms not improving with trazodone.  We also discussed changing cymbalta to am dosing instead of at bedtime.

## 2025-01-09 NOTE — TELEPHONE ENCOUNTER
Rx for Pressure Change and resupply sent to [facility] via [place] .

## 2025-01-14 ENCOUNTER — PATIENT MESSAGE (OUTPATIENT)
Dept: SLEEP CENTER | Facility: CLINIC | Age: 50
End: 2025-01-14

## 2025-01-14 DIAGNOSIS — G47.33 OBSTRUCTIVE SLEEP APNEA TREATED WITH CONTINUOUS POSITIVE AIRWAY PRESSURE (CPAP): Primary | ICD-10-CM

## 2025-01-14 NOTE — PATIENT COMMUNICATION
Compliance report requested from [employer] via email.     Compliance report in media.     TAJ Wells     Please review and advise on patient request,  Thank you.

## 2025-01-15 ENCOUNTER — TELEPHONE (OUTPATIENT)
Dept: SLEEP CENTER | Facility: CLINIC | Age: 50
End: 2025-01-15

## 2025-01-15 NOTE — PROGRESS NOTES
Assessment & Plan  Obesity, Class I, BMI 30-34.9  Initial weight: 190 (12/05/24)  Current weight: 173   TBW loss%: 9 percent     Medication: Continue Zepbound 5 mg in conjunction with lifestyle modifications.  Patient has improved her fluid intake and has been practicing calorie restriction with frequent meals throughout the day    Recommend practicing mindful eating and drinking with frequent meals/snacks as it can potentially improve metabolism and allow for better portion control by decreasing Ghrelin (hunger hormone).      Recommend to time your carbohydrate consumption: Minimizing meals high in complex carbs close to bedtime, as they can interfere with sleep as well as potentially negatively impact your metabolism and promote weight gain.  Carbohydrates throughout the day is necessary as it serves a fuel for your daily activities.  Consistently with protein intake throughout the day can help with satiety, muscle repair/growth and improve metabolism     Recommend adequate hydration which is at least half your body weight in ounces to help control cravings (decreasing confusion for appetite vs water deprivation) and to promote nutritional support as the human body is made of 50-65% of water          Return in about 8 weeks (around 3/13/2025) for followup .     Most recent notes, labs and previous medical records were reviewed.       ______________________________________________________________________        Subjective:     Chief Complaint   Patient presents with    Follow-up     MWM-5Wk F/u; Waist-36.5in       HPI: Yu Montes  is a 49 y.o. female with vitamin D deficiency, class I obesity, major depressive disorder, hyperlipidemia, ABENA on CPAP on zepbound 5mg presents to the clinic for follow-up    Previous diet:   Daily Diet: Yougurt, granola, prestzels and sandwhich ham cheese, Dinner- hamburgers, spagetti, tacos. Snacks: chocolates (sweets)   Skip Meals: do not   Dining out/takeout: twice a  "month  Hydration: 1 cup coffee, 4-5 bottles.     Current Diet:   Patient is currently eating about 3 meals a day and has increased her fluid intake to about 80 ounces.  Initially she was having some nausea after the 2 of the injection but has improved after increased her fluid and p.o. intake.    Occupation: Teacher   Exercise: She does weight training 1-2 times a week for at least 30 min a week       ROS:  Constitutional:  Negative for diaphoresis.   Gastrointestinal:  Negative for abdominal pain, and vomiting.   Skin:  Negative for pallor.   Psychiatric/Behavioral:  Negative for behavioral problems, confusion, dysphoric mood and hallucinations.    All other systems reviewed and are negative.     Objective:  /69   Pulse 76   Temp 98.6 °F (37 °C)   Resp 16   Ht 5' 2\" (1.575 m)   Wt 78.6 kg (173 lb 3.2 oz)   BMI 31.68 kg/m²     Wt Readings from Last 20 Encounters:   01/16/25 78.6 kg (173 lb 3.2 oz)   01/08/25 80.7 kg (178 lb)   12/05/24 86.2 kg (190 lb)   08/13/24 84.2 kg (185 lb 9.6 oz)   06/19/24 82.8 kg (182 lb 9.6 oz)       Physical Exam  Constitutional:       General: No acute distress.  Well-nourished  HENT:      Head: Normocephalic and atraumatic.   Eyes:      Extraocular Movements: Extraocular movements intact.      Conjunctiva/sclera: Conjunctivae normal.      Pupils: Pupils are equal, round, and reactive to light.   Cardiovascular:      Rate and Rhythm: Normal rate.   Pulmonary:      Effort: Pulmonary effort is normal.   Neurological:      General: No focal deficit present.  AO x 3     Mental Status: Alert and oriented to person, place, and time. Mental status is at baseline.   Psychiatric:         Mood and Affect: Mood normal.         Behavior: Behavior normal.     Labs and Imaging  Recent labs and imaging have been personally reviewed.    Lab Results   Component Value Date    WBC 7.8 06/21/2024    HGB 13.7 06/21/2024    HCT 41.4 06/21/2024    MCV 87.3 06/21/2024     06/21/2024       Lab " Results   Component Value Date    SODIUM 135 06/21/2024    K 4.7 06/21/2024     06/21/2024    CO2 22 06/21/2024    AGAP 7 07/28/2022    BUN 13 06/21/2024    CREATININE 0.75 06/21/2024    GLUC 88 06/21/2024    CALCIUM 8.9 06/21/2024    AST 15 06/21/2024    ALT 20 06/21/2024    ALKPHOS 62 06/21/2024    TP 6.9 06/21/2024    TBILI 0.6 06/21/2024    EGFR 98 06/21/2024       Lab Results   Component Value Date    HGBA1C 5.5 06/21/2024       Lab Results   Component Value Date    TSH 1.33 04/25/2019       Lab Results   Component Value Date    CHOLESTEROL 185 06/21/2024       Lab Results   Component Value Date    HDL 44 (L) 06/21/2024       Lab Results   Component Value Date    TRIG 99 06/21/2024       Lab Results   Component Value Date    LDLCALC 120 (H) 06/21/2024

## 2025-01-15 NOTE — PATIENT COMMUNICATION
PAP pressure change sent to [employer] via [employer] in a new encounter.      [employer] message to update patient.

## 2025-01-16 ENCOUNTER — OFFICE VISIT (OUTPATIENT)
Dept: BARIATRICS | Facility: CLINIC | Age: 50
End: 2025-01-16
Payer: COMMERCIAL

## 2025-01-16 VITALS
TEMPERATURE: 98.6 F | HEART RATE: 76 BPM | RESPIRATION RATE: 16 BRPM | SYSTOLIC BLOOD PRESSURE: 100 MMHG | DIASTOLIC BLOOD PRESSURE: 69 MMHG | WEIGHT: 173.2 LBS | BODY MASS INDEX: 31.87 KG/M2 | HEIGHT: 62 IN

## 2025-01-16 DIAGNOSIS — E66.811 OBESITY, CLASS I, BMI 30-34.9: Primary | ICD-10-CM

## 2025-01-16 PROCEDURE — 99214 OFFICE O/P EST MOD 30 MIN: CPT | Performed by: STUDENT IN AN ORGANIZED HEALTH CARE EDUCATION/TRAINING PROGRAM

## 2025-01-29 DIAGNOSIS — F33.42 RECURRENT MAJOR DEPRESSIVE DISORDER, IN FULL REMISSION (HCC): ICD-10-CM

## 2025-01-30 RX ORDER — DULOXETIN HYDROCHLORIDE 30 MG/1
30 CAPSULE, DELAYED RELEASE ORAL DAILY
Qty: 90 CAPSULE | Refills: 0 | Status: SHIPPED | OUTPATIENT
Start: 2025-01-30

## 2025-01-31 ENCOUNTER — PATIENT MESSAGE (OUTPATIENT)
Dept: SLEEP CENTER | Facility: CLINIC | Age: 50
End: 2025-01-31

## 2025-01-31 DIAGNOSIS — F51.04 PSYCHOPHYSIOLOGICAL INSOMNIA: Primary | ICD-10-CM

## 2025-01-31 DIAGNOSIS — G47.9 SLEEP DISTURBANCE: ICD-10-CM

## 2025-01-31 RX ORDER — DOXEPIN HYDROCHLORIDE 10 MG/1
10 CAPSULE ORAL
Qty: 30 CAPSULE | Refills: 1 | Status: SHIPPED | OUTPATIENT
Start: 2025-01-31

## 2025-02-11 ENCOUNTER — RA CDI HCC (OUTPATIENT)
Dept: OTHER | Facility: HOSPITAL | Age: 50
End: 2025-02-11

## 2025-03-05 NOTE — TELEPHONE ENCOUNTER
Is This A New Presentation, Or A Follow-Up?: Rash Patient scheduled med check for 2/13 with Dr. Pendleton

## 2025-03-18 NOTE — PROGRESS NOTES
Assessment & Plan         Initial weight:   Previous weight:   Current weight:    TBW loss%:    Medication: **    Refer to **     Patient understands the importance of making lifestyle changes as recommended below to aid in weight loss.      Dietary modifications: **      Dietary Recommendations:  Recommend to avoid skipping any meals. Adequate amount of Macronutrients (minimal 3 meals a day) is necessary to help improve metabolism, satiety and allow for better portion control by decreasing Ghrelin (hunger hormone). Lack of hunger can be suppressed by a hormone called Leptin (full hormone) which can occur from a previous meal or caffeine intake    Protein intake throughout the day can help promote satiety and is necessary for muscle growth/repair    Carbohydrates are essential as it is the vital source of fuel for daily activities. energy, cell function, nutrient absorption, and hormone production.     Fats: Essential vitamins like A, D, and E, support cell growth, function and are necessary for nutrient absorption to support your organs     Fluid intake which is at least half your body weight in ounces is necessary to help control cravings (decreasing confusion for appetite vs water deprivation) as the human body is made up of 50-70% of Fluids. If there is a diversion for water alone, would recommend flavored water (example-splash of lemonade or ice tea) to help promote compliance. Fluids include Teas, water, flavored water, seltzer water, coffee, shakes    Metabolism:    Metabolism can also be promoted by macronutrient intake and increased muscle weight via thermogenesis      Daily Calorie Needs: Recommend to take into account any fluid losses and calories burned via increased activity levels as daily calories may need to be adjusted     Weight check: Weights can fluctuate depending on fluid shifts vs what foods are consumed prior to checking your weight          No follow-ups on file.     Most recent notes,  labs and previous medical records were reviewed. Total time with chart review and with the patient: 35 min      ______________________________________________________________________        Subjective:   No chief complaint on file.      HPI: 49 y.o. female with pmh of  ** presents for follow-up    Current Medication:     Wt Loss History:   Onset:   Previous Medications:   Previous dietary programs:    Previous Regimen:       Dietary Regimen:  Breakfast  Lunch  Dinner              Snacks:  Cravings:    Meals Skipped:    Fluids: Water:             Other Beverages:             Coffee/Tea:                  Physical Activity intake :    Frequency:          Duration:     Social hx:  Lives with **  Occupation:       Review Of Systems:  General: No pallor, no weakness   Pulmonary: Negative for shortness of breath  Chest: negative for chest pain  Gastrointestinal:  Negative for abdominal pain, diarrhea   Psychiatric/Behavioral:  Negative for behavioral problems, confusion, dysphoric mood and hallucinations.    All other systems reviewed and are negative.     Objective:  There were no vitals taken for this visit.    Wt Readings from Last 30 Encounters:   01/16/25 78.6 kg (173 lb 3.2 oz)   01/08/25 80.7 kg (178 lb)   12/05/24 86.2 kg (190 lb)   08/13/24 84.2 kg (185 lb 9.6 oz)   06/19/24 82.8 kg (182 lb 9.6 oz)       Physical Exam  Constitutional:       General: No acute distress.  Well-nourished  HENT:      Head: Normocephalic and atraumatic.   Eyes:      Extraocular Movements: Extraocular movements intact.      Conjunctiva/pupils: Conjunctivae normal. Pupils are equal, round  Pulmonary:      Effort: Pulmonary effort is normal. No labored breathing   Neurological:      General: No focal deficit present.  AO x 3     Mental Status: Alert and oriented to person, place, and time. Mental status is at baseline.   Psychiatric:         Mood and Affect: Mood normal.         Behavior: Behavior normal.     Labs and Imaging  Recent labs  and imaging have been personally reviewed.

## 2025-03-19 ENCOUNTER — OFFICE VISIT (OUTPATIENT)
Dept: BARIATRICS | Facility: CLINIC | Age: 50
End: 2025-03-19
Payer: COMMERCIAL

## 2025-03-19 VITALS
TEMPERATURE: 98.7 F | WEIGHT: 163.2 LBS | BODY MASS INDEX: 30.03 KG/M2 | RESPIRATION RATE: 16 BRPM | HEART RATE: 73 BPM | DIASTOLIC BLOOD PRESSURE: 72 MMHG | SYSTOLIC BLOOD PRESSURE: 100 MMHG | HEIGHT: 62 IN

## 2025-03-19 DIAGNOSIS — F32.A DEPRESSIVE DISORDER: ICD-10-CM

## 2025-03-19 DIAGNOSIS — E78.5 HYPERLIPIDEMIA, UNSPECIFIED HYPERLIPIDEMIA TYPE: ICD-10-CM

## 2025-03-19 DIAGNOSIS — G47.00 INSOMNIA, UNSPECIFIED TYPE: ICD-10-CM

## 2025-03-19 DIAGNOSIS — G47.33 OSA (OBSTRUCTIVE SLEEP APNEA): ICD-10-CM

## 2025-03-19 DIAGNOSIS — E66.811 OBESITY, CLASS I, BMI 30-34.9: ICD-10-CM

## 2025-03-19 PROCEDURE — 99213 OFFICE O/P EST LOW 20 MIN: CPT | Performed by: PHYSICIAN ASSISTANT

## 2025-03-19 RX ORDER — TRAZODONE HYDROCHLORIDE 50 MG/1
50 TABLET ORAL
COMMUNITY

## 2025-03-19 RX ORDER — TIRZEPATIDE 5 MG/.5ML
5 INJECTION, SOLUTION SUBCUTANEOUS WEEKLY
Qty: 2 ML | Refills: 3 | Status: SHIPPED | OUTPATIENT
Start: 2025-03-19

## 2025-03-19 RX ORDER — BUSPIRONE HYDROCHLORIDE 15 MG/1
TABLET ORAL
COMMUNITY

## 2025-03-19 NOTE — PROGRESS NOTES
Assessment & Plan  Obesity, Class I, BMI 30-34.9  Continue Zepbound 5mg weekly-- current dose has been effective with mild side effects, would not suggest increase at this time.   She has had 14.21% weight loss since starting Zepbound.   Will refer to RD for consultation  Continue Regular exercise-- suggest   Reviewed Plate method for dinners with Family   Recommend ~ 75-90g protein daily until seen by RD       Return in about 4 months (around 7/19/2025).       Subjective:   Chief Complaint   Patient presents with   • Follow-up     MWM-2M F/u; Waist-34.5in       Patient ID: Yu Montes  is a 49 y.o. female with excess weight/obesity here for Weight Management Follow up Visit    HPI: Here for Medical Weight Management Follow Up Visit    Obesity/Excess Weight:  Current BMI: Body mass index is 29.85 kg/m².   Initial Weight: 190  Last visit Weight: 173  Current Weight: 163    Current Weight Management Plan:   Current Medication: Zepbound 5mg  Medication Side effects:  slight nausea day after injection    Food Recall:  Breakfast: Harboiled eggs or protein shake  Lunch: Varies-- Yesterday, had a salad, sometimes leftovers from dinner with chicken  Dinner: Cooks for family-- some type of meat like pot roast or chicken with vegetables  Kids are football players, like to eat a lot of red meat.     Hydration: 80 ounches    Lifestyle History:  Exercise: Exercise class once per week then walking  Occupation: Works at [employer]        Wt Readings from Last 20 Encounters:   03/19/25 74 kg (163 lb 3.2 oz)   01/16/25 78.6 kg (173 lb 3.2 oz)   01/08/25 80.7 kg (178 lb)   12/05/24 86.2 kg (190 lb)   08/13/24 84.2 kg (185 lb 9.6 oz)   06/19/24 82.8 kg (182 lb 9.6 oz)        Review of Systems    Past Medical History:   Diagnosis Date   • Sleep apnea-like behavior 08/13/2024       Past Surgical History:   Procedure Laterality Date   • KNEE SURGERY  1998        No Known Allergies     Objective:    /72   Pulse 73   Temp 98.7 °F  "(37.1 °C)   Resp 16   Ht 5' 2\" (1.575 m)   Wt 74 kg (163 lb 3.2 oz)   BMI 29.85 kg/m²     Physical Exam:  Constitutional:  she  appears well-developed and well-nourished. No distress.   HENT:   Head: Normocephalic and atraumatic.   Neck: Normal range of motion.   Pulmonary/Chest: Effort normal.   Musculoskeletal: Normal range of motion.   Neurological: she  is alert and oriented to person, place, and time.   Skin: she  is not diaphoretic.   Psychiatric: she  has a normal mood and affect. her  behavior is normal.        LABS:    Lab Results   Component Value Date    HGBA1C 5.5 06/21/2024      Lab Results   Component Value Date    SODIUM 135 06/21/2024    K 4.7 06/21/2024     06/21/2024    CO2 22 06/21/2024    AGAP 7 07/28/2022    BUN 13 06/21/2024    CREATININE 0.75 06/21/2024    GLUC 88 06/21/2024    CALCIUM 8.9 06/21/2024    AST 15 06/21/2024    ALT 20 06/21/2024    ALKPHOS 62 06/21/2024    TP 6.9 06/21/2024    TBILI 0.6 06/21/2024    EGFR 98 06/21/2024 06/21/2024   Lab Results   Component Value Date    TSH 1.33 04/25/2019      "

## 2025-04-17 ENCOUNTER — OFFICE VISIT (OUTPATIENT)
Dept: FAMILY MEDICINE CLINIC | Facility: CLINIC | Age: 50
End: 2025-04-17
Payer: COMMERCIAL

## 2025-04-17 VITALS
HEIGHT: 62 IN | OXYGEN SATURATION: 98 % | TEMPERATURE: 97.7 F | SYSTOLIC BLOOD PRESSURE: 100 MMHG | DIASTOLIC BLOOD PRESSURE: 70 MMHG | WEIGHT: 158.8 LBS | BODY MASS INDEX: 29.22 KG/M2 | HEART RATE: 77 BPM

## 2025-04-17 DIAGNOSIS — Z13.220 SCREENING, LIPID: ICD-10-CM

## 2025-04-17 DIAGNOSIS — F41.9 ANXIETY: ICD-10-CM

## 2025-04-17 DIAGNOSIS — Z13.1 DIABETES MELLITUS SCREENING: Primary | ICD-10-CM

## 2025-04-17 DIAGNOSIS — G47.00 INSOMNIA, UNSPECIFIED TYPE: ICD-10-CM

## 2025-04-17 DIAGNOSIS — F33.42 RECURRENT MAJOR DEPRESSIVE DISORDER, IN FULL REMISSION (HCC): ICD-10-CM

## 2025-04-17 DIAGNOSIS — Z11.59 NEED FOR HEPATITIS C SCREENING TEST: ICD-10-CM

## 2025-04-17 DIAGNOSIS — G47.33 OBSTRUCTIVE SLEEP APNEA TREATED WITH CONTINUOUS POSITIVE AIRWAY PRESSURE (CPAP): ICD-10-CM

## 2025-04-17 DIAGNOSIS — Z76.89 ENCOUNTER TO ESTABLISH CARE: ICD-10-CM

## 2025-04-17 DIAGNOSIS — Z13.0 SCREENING, ANEMIA, DEFICIENCY, IRON: ICD-10-CM

## 2025-04-17 DIAGNOSIS — Z13.29 SCREENING FOR THYROID DISORDER: ICD-10-CM

## 2025-04-17 PROCEDURE — 99214 OFFICE O/P EST MOD 30 MIN: CPT | Performed by: NURSE PRACTITIONER

## 2025-04-17 RX ORDER — DULOXETIN HYDROCHLORIDE 30 MG/1
30 CAPSULE, DELAYED RELEASE ORAL DAILY
Qty: 90 CAPSULE | Refills: 1 | Status: SHIPPED | OUTPATIENT
Start: 2025-04-17

## 2025-04-17 NOTE — PROGRESS NOTES
Name: Yu Montes      : 1975      MRN: [id number]  Encounter Provider: TAJ Raymond  Encounter Date: 2025   Encounter department: [facility]  :  Assessment & Plan  Recurrent major depressive disorder, in full remission (HCC)  Patient doing well on current Cymbalta 30  Will continue same treatment for now  She is hoping to wean off in the future  Son will be leaving for college, so she is having several changes at this time.  We decided we will continue treatment and revisit at her 6-month follow-up (annual physical)  She denies depression-no SI/HI  She will follow-up sooner with problems or concerns  ER precaution with any acute mental health change    Orders:    DULoxetine (CYMBALTA) 30 mg delayed release capsule; Take 1 capsule (30 mg total) by mouth daily    Diabetes mellitus screening    Orders:    Comprehensive metabolic panel; Future    Screening, anemia, deficiency, iron    Orders:    CBC and differential; Future    Screening, lipid    Orders:    Lipid panel; Future    Screening for thyroid disorder    Orders:    TSH, 3rd generation with Free T4 reflex; Future    Need for hepatitis C screening test    Orders:    Hepatitis C Antibody; Future    Encounter to establish care    Orders:    Ambulatory Referral to Obstetrics / Gynecology; Future    Insomnia, unspecified type  Managed by sleep medicine  Trazodone 50 along with CPAP nightly       Obstructive sleep apnea treated with continuous positive airway pressure (CPAP)  Managed by sleep medicine       Anxiety  Well-controlled with Cymbalta  Periodic panic attacks-takes lorazepam 0.5 (half tab)  Averaging 1 to 2/month  Has current prescription-no refill needed today       Preventative care: Due for her annual physical at next follow-up (6 months).  Fasting lab work orders placed for her to complete prior       History of Present Illness   Routine follow up  PCP managing depression; anxiety  Reports she feels well  "controlled on current Cymbalta 30  Interested in weaning in the future, but she does have some potential increased stressors over the next few months    Sleep medicine manages her CPAP.   Takes trazodone 50 mg at at bedtime to assist with sleep-as she was unable to tolerate the CPAP machine prior    Weight management manage is managing her GLP-1  Has lost a total of 15 pounds since starting-current dose 5 mg weekly      Review of Systems   Constitutional: Negative.    HENT: Negative.     Eyes: Negative.    Respiratory: Negative.     Cardiovascular: Negative.    Gastrointestinal: Negative.    Genitourinary: Negative.    Musculoskeletal: Negative.    Skin: Negative.    Neurological: Negative.    Psychiatric/Behavioral: Negative.  Negative for dysphoric mood, self-injury, sleep disturbance and suicidal ideas. The patient is not nervous/anxious.        Objective   /70   Pulse 77   Temp 97.7 °F (36.5 °C)   Ht 5' 2\" (1.575 m)   Wt 72 kg (158 lb 12.8 oz)   LMP 04/11/2025   SpO2 98%   BMI 29.04 kg/m²      Physical Exam  Vitals and nursing note reviewed.   Constitutional:       General: She is not in acute distress.     Appearance: Normal appearance. She is well-developed and well-groomed. She is not ill-appearing.   HENT:      Head: Normocephalic.      Right Ear: Tympanic membrane, ear canal and external ear normal.      Left Ear: Tympanic membrane, ear canal and external ear normal.      Nose: Nose normal.      Mouth/Throat:      Mouth: Mucous membranes are moist.      Pharynx: Oropharynx is clear.   Eyes:      Conjunctiva/sclera: Conjunctivae normal.      Pupils: Pupils are equal, round, and reactive to light.   Neck:      Thyroid: No thyromegaly.      Vascular: No carotid bruit.   Cardiovascular:      Rate and Rhythm: Normal rate and regular rhythm.      Pulses:           Posterior tibial pulses are 2+ on the right side and 2+ on the left side.      Heart sounds: Normal heart sounds.   Pulmonary:      " Effort: Pulmonary effort is normal. No respiratory distress.      Breath sounds: Normal breath sounds and air entry.   Musculoskeletal:      Right lower leg: No edema.      Left lower leg: No edema.   Lymphadenopathy:      Cervical: No cervical adenopathy.   Skin:     General: Skin is warm and dry.   Neurological:      General: No focal deficit present.      Mental Status: She is alert and oriented to person, place, and time.   Psychiatric:         Attention and Perception: Attention normal.         Mood and Affect: Mood normal.         Behavior: Behavior normal.         Thought Content: Thought content normal.         Judgment: Judgment normal.

## 2025-06-26 ENCOUNTER — PATIENT MESSAGE (OUTPATIENT)
Dept: BARIATRICS | Facility: CLINIC | Age: 50
End: 2025-06-26

## 2025-06-27 DIAGNOSIS — E66.811 OBESITY, CLASS I, BMI 30-34.9: ICD-10-CM

## 2025-06-27 DIAGNOSIS — G47.33 OBSTRUCTIVE SLEEP APNEA TREATED WITH CONTINUOUS POSITIVE AIRWAY PRESSURE (CPAP): Primary | ICD-10-CM

## 2025-06-27 RX ORDER — TIRZEPATIDE 7.5 MG/.5ML
7.5 INJECTION, SOLUTION SUBCUTANEOUS WEEKLY
Qty: 2 ML | Refills: 0 | Status: SHIPPED | OUTPATIENT
Start: 2025-06-27 | End: 2025-08-22

## 2025-07-07 NOTE — PATIENT COMMUNICATION
Patient called because she just received another message from pharmacy today. I told her that we did receive her message that Pre auth is needed and we are working on it.

## 2025-07-09 ENCOUNTER — TELEPHONE (OUTPATIENT)
Dept: BARIATRICS | Facility: CLINIC | Age: 50
End: 2025-07-09

## 2025-07-09 NOTE — TELEPHONE ENCOUNTER
PA for Zepbound 7.5 mg SUBMITTED to [facility] [employer]    via    [x]CMM-KEY: [id number]  []Surescripts-Case ID #    []Availity-Auth ID #  NDC #    []Faxed to plan   []Other website    []Phone call Case ID #      [x]PA sent as URGENT    All office notes, labs and other pertaining documents and studies sent. Clinical questions answered. Awaiting determination from insurance company.     Turnaround time for your insurance to make a decision on your Prior Authorization can take 7-21 business days.

## 2025-07-10 NOTE — TELEPHONE ENCOUNTER
PA for Zepbound 7.5 mg  APPROVED     Date(s) approved 7/9/25-7/9/26    Case #     Patient advised by          [x]MyChart Message  []Phone call   []LMOM  []L/M to call office as no active Communication consent on file  []Unable to leave detailed message as VM not approved on Communication consent       Pharmacy advised by    []Fax  [x]Phone call  []Secure Chat    Specialty Pharmacy    []      Approval letter scanned into Media Yes

## 2025-07-15 NOTE — PROGRESS NOTES
Assessment & Plan  Obesity, Class I, BMI 30-34.9    Initial weight: 190 (12/05/24)   Previous weight: 173   Current weight: 150  Goal: 140     Med: Zepbound 5, increase to 7.5mg     TBW:21      Mindful Eating and Drinking is necessary to promote healthy behaviors that can lead to decreased adipose tissue which can be curative and preventative for comorbidities such as hypertension, diabetes, anxiety, depression, osteoarthritis, dyslipidemia, asthma, liver disease, cardiovascular disease, stroke, sleep apnea and cancers.       Macronutrients: (Nutrients that the body needs for energy and support vital functions)    Protein is necessary to promote satiety, metabolism, and muscle growth/repair. Increased energy expenditure is used for the processes of breaking down and rebuilding proteins within the body   Carbohydrates are essential as it is the body's main fuel source for daily activities.  Recommend that carbohydrates be consumed mostly during the times you are more active rather than high amounts before bedtime  Fats: Essential vitamins like A, D, and E, protect your organs, support cell growth and contribute to maintaining healthy cholesterol levels      Fluid intake which is at least half your body weight in ounces is necessary to help control cravings (decreasing confusion for appetite vs water deprivation) as the human body is made up of 50-70% of Fluids. If there is a diversion for water alone, would recommend flavored water (example-splash of lemonade or ice tea) to help promote compliance. Fluids include Teas, water, flavored water, seltzer water, coffee, shakes    Metabolism:    Metabolism can also be promoted by meal frequency, protein intake and increased muscle mass     Daily Calorie Needs: are individualized and will need to take into account any fluid losses, increased activity levels which may need to be adjusted daily. Recommended to not skip any meals even if there is a lack of appetite as it can  "be suppressed by caffeine or any prior heavy meal due to Leptin (satiety hormone).  Calorie and fluid intake will need to be increased if there is any increased activity during the day compared to previous days.  With proper fluid and macronutrient intake throughout the day, portion control and decreased cravings will improve     Weight check: BMI and weight serve as only guidelines as it is not factor in muscle mass, fat, water. Weights can fluctuate depending on fluid shifts vs what foods are consumed prior to checking your weight           Return in about 15 weeks (around 10/29/2025) for followup .     Most recent notes, labs and previous medical records were reviewed.       ______________________________________________________________________        Subjective:     Chief Complaint   Patient presents with    Follow-up     MWM F/u; Waist 32in       HPI: Yu Montes  is a 50 y.o. female with vitamin D deficiency, class I obesity, major depressive disorder, hyperlipidemia, ABENA on CPAP on zepbound 5mg presents to the clinic for follow-up    Med: Zepbound 5    Current Diet:       B-Beef sticks, collagen  L-[place]  Dinner- ground beef, chicken   Fluids: 80 ounces of water     Lifestyle:   Occupation: Teacher   Live 2 sons, 17, 19     Exercise: She does weight training 1-2 times a week for at least 30 min a week     ROS:  Constitutional:  Negative for diaphoresis.   Gastrointestinal:  Negative for abdominal pain, and vomiting.   Skin:  Negative for pallor.   Psychiatric/Behavioral:  Negative for behavioral problems, confusion, dysphoric mood and hallucinations.    All other systems reviewed and are negative.     Objective:  /72 (BP Location: Left arm, Patient Position: Sitting)   Pulse 86   Temp 98.3 °F (36.8 °C) (Tympanic)   Resp 16   Ht 5' 2\" (1.575 m)   Wt 68.1 kg (150 lb 3.2 oz)   LMP 07/02/2025   BMI 27.47 kg/m²     Wt Readings from Last 20 Encounters:   07/16/25 68.1 kg (150 lb 3.2 oz)   04/17/25 " 72 kg (158 lb 12.8 oz)   03/19/25 74 kg (163 lb 3.2 oz)   01/16/25 78.6 kg (173 lb 3.2 oz)   01/08/25 80.7 kg (178 lb)   12/05/24 86.2 kg (190 lb)   08/13/24 84.2 kg (185 lb 9.6 oz)   06/19/24 82.8 kg (182 lb 9.6 oz)       Physical Exam  Constitutional:       General: No acute distress.  Well-nourished  HENT:      Head: Normocephalic and atraumatic.   Eyes:      Extraocular Movements: Extraocular movements intact.      Conjunctiva/sclera: Conjunctivae normal.      Pupils: Pupils are equal, round, and reactive to light.   Cardiovascular:      Rate and Rhythm: Normal rate.   Pulmonary:      Effort: Pulmonary effort is normal.   Neurological:      General: No focal deficit present.  AO x 3     Mental Status: Alert and oriented to person, place, and time. Mental status is at baseline.   Psychiatric:         Mood and Affect: Mood normal.         Behavior: Behavior normal.     Labs and Imaging  Recent labs and imaging have been personally reviewed.    Lab Results   Component Value Date    WBC 7.8 06/21/2024    HGB 13.7 06/21/2024    HCT 41.4 06/21/2024    MCV 87.3 06/21/2024     06/21/2024       Lab Results   Component Value Date    SODIUM 135 06/21/2024    K 4.7 06/21/2024     06/21/2024    CO2 22 06/21/2024    AGAP 7 07/28/2022    BUN 13 06/21/2024    CREATININE 0.75 06/21/2024    GLUC 88 06/21/2024    CALCIUM 8.9 06/21/2024    AST 15 06/21/2024    ALT 20 06/21/2024    ALKPHOS 62 06/21/2024    TP 6.9 06/21/2024    TBILI 0.6 06/21/2024    EGFR 98 06/21/2024       Lab Results   Component Value Date    HGBA1C 5.5 06/21/2024       Lab Results   Component Value Date    TSH 1.33 04/25/2019       Lab Results   Component Value Date    CHOLESTEROL 185 06/21/2024       Lab Results   Component Value Date    HDL 44 (L) 06/21/2024       Lab Results   Component Value Date    TRIG 99 06/21/2024       Lab Results   Component Value Date    LDLCALC 120 (H) 06/21/2024

## 2025-07-16 ENCOUNTER — OFFICE VISIT (OUTPATIENT)
Dept: BARIATRICS | Facility: CLINIC | Age: 50
End: 2025-07-16
Payer: COMMERCIAL

## 2025-07-16 VITALS
HEART RATE: 86 BPM | HEIGHT: 62 IN | BODY MASS INDEX: 27.64 KG/M2 | TEMPERATURE: 98.3 F | RESPIRATION RATE: 16 BRPM | DIASTOLIC BLOOD PRESSURE: 72 MMHG | WEIGHT: 150.2 LBS | SYSTOLIC BLOOD PRESSURE: 106 MMHG

## 2025-07-16 DIAGNOSIS — F32.A DEPRESSIVE DISORDER: ICD-10-CM

## 2025-07-16 DIAGNOSIS — G47.33 OBSTRUCTIVE SLEEP APNEA TREATED WITH CONTINUOUS POSITIVE AIRWAY PRESSURE (CPAP): ICD-10-CM

## 2025-07-16 DIAGNOSIS — E66.811 OBESITY, CLASS I, BMI 30-34.9: Primary | ICD-10-CM

## 2025-07-16 DIAGNOSIS — G47.33 OSA (OBSTRUCTIVE SLEEP APNEA): ICD-10-CM

## 2025-07-16 PROCEDURE — 99214 OFFICE O/P EST MOD 30 MIN: CPT | Performed by: STUDENT IN AN ORGANIZED HEALTH CARE EDUCATION/TRAINING PROGRAM

## 2025-07-16 RX ORDER — TIRZEPATIDE 7.5 MG/.5ML
7.5 INJECTION, SOLUTION SUBCUTANEOUS WEEKLY
Qty: 2 ML | Refills: 3 | Status: SHIPPED | OUTPATIENT
Start: 2025-07-16 | End: 2025-07-29 | Stop reason: SDUPTHER

## 2025-07-28 DIAGNOSIS — G47.33 OSA (OBSTRUCTIVE SLEEP APNEA): Primary | ICD-10-CM

## 2025-07-28 DIAGNOSIS — E78.5 HYPERLIPIDEMIA, UNSPECIFIED HYPERLIPIDEMIA TYPE: ICD-10-CM

## 2025-07-28 DIAGNOSIS — F32.A DEPRESSIVE DISORDER: ICD-10-CM

## 2025-07-28 RX ORDER — TIRZEPATIDE 7.5 MG/.5ML
7.5 INJECTION, SOLUTION SUBCUTANEOUS WEEKLY
Qty: 2 ML | Refills: 4 | Status: SHIPPED | OUTPATIENT
Start: 2025-07-28 | End: 2025-07-29

## 2025-07-29 DIAGNOSIS — F32.A DEPRESSIVE DISORDER: ICD-10-CM

## 2025-07-29 DIAGNOSIS — E66.811 OBESITY, CLASS I, BMI 30-34.9: ICD-10-CM

## 2025-07-29 DIAGNOSIS — G47.33 OBSTRUCTIVE SLEEP APNEA TREATED WITH CONTINUOUS POSITIVE AIRWAY PRESSURE (CPAP): ICD-10-CM

## 2025-07-29 DIAGNOSIS — E78.5 HYPERLIPIDEMIA: Primary | ICD-10-CM

## 2025-07-29 RX ORDER — TIRZEPATIDE 7.5 MG/.5ML
7.5 INJECTION, SOLUTION SUBCUTANEOUS WEEKLY
Qty: 2 ML | Refills: 3 | Status: SHIPPED | OUTPATIENT
Start: 2025-07-29